# Patient Record
Sex: FEMALE | URBAN - METROPOLITAN AREA
[De-identification: names, ages, dates, MRNs, and addresses within clinical notes are randomized per-mention and may not be internally consistent; named-entity substitution may affect disease eponyms.]

---

## 2022-01-24 ENCOUNTER — APPOINTMENT (RX ONLY)
Dept: URBAN - METROPOLITAN AREA CLINIC 74 | Facility: CLINIC | Age: 47
Setting detail: DERMATOLOGY
End: 2022-01-24

## 2022-01-24 DIAGNOSIS — L20.89 OTHER ATOPIC DERMATITIS: ICD-10-CM

## 2022-01-24 DIAGNOSIS — Z71.89 OTHER SPECIFIED COUNSELING: ICD-10-CM

## 2022-01-24 PROCEDURE — ? TREATMENT REGIMEN

## 2022-01-24 PROCEDURE — ? COUNSELING

## 2022-01-24 PROCEDURE — 99203 OFFICE O/P NEW LOW 30 MIN: CPT

## 2022-01-24 PROCEDURE — ? PATIENT SPECIFIC COUNSELING

## 2022-01-24 PROCEDURE — ? PRESCRIPTION

## 2022-01-24 RX ORDER — FLUOCINONIDE 0.5 MG/G
CREAM TOPICAL
Qty: 60 | Refills: 1 | Status: ERX | COMMUNITY
Start: 2022-01-24

## 2022-01-24 RX ADMIN — FLUOCINONIDE: 0.5 CREAM TOPICAL at 00:00

## 2022-01-24 ASSESSMENT — LOCATION DETAILED DESCRIPTION DERM
LOCATION DETAILED: LEFT INFERIOR FOREHEAD
LOCATION DETAILED: RIGHT PROXIMAL PRETIBIAL REGION

## 2022-01-24 ASSESSMENT — LOCATION SIMPLE DESCRIPTION DERM
LOCATION SIMPLE: RIGHT PRETIBIAL REGION
LOCATION SIMPLE: LEFT FOREHEAD

## 2022-01-24 ASSESSMENT — LOCATION ZONE DERM
LOCATION ZONE: LEG
LOCATION ZONE: FACE

## 2022-01-24 NOTE — PROCEDURE: PATIENT SPECIFIC COUNSELING
- Pt reports whole body red itchy rash that started around [place] on R shin\\n- now spread to both legs, shoulder\\n- started pravastatin 6mo ago, but denies other new meds\\n- COVID booster 11/3/21\\n- allergies: penicillin, seasonal allergies\\n- denies new skincare products \\n- denies recent illnesses\\n- papular and nummular on exam today \\n- refrain from shaving until clear \\n- spot treat affected areas w Fluocinonide \\n- photos today
Detail Level: Zone

## 2022-01-24 NOTE — HPI: RASH
Is The Patient Presenting As Previously Scheduled?: Yes
How Severe Is Your Rash?: moderate
Is This A New Presentation, Or A Follow-Up?: Rash
Additional History: Reports rash started on R leg around [place]. Spread to L leg, now on L shoulder now. Has tried OTC products only. Reports history seasonal allergies, but denies other allergies. No new pets, contacts. No recent illness. Reports starting pravastatin 6mo ago, but denies other new medications.

## 2022-01-24 NOTE — PROCEDURE: TREATMENT REGIMEN
Initiate Treatment: - apply Fluocinonide 0.05% cream to affected areas of skin BID x 10d
Detail Level: Zone
Samples Given: CeraVe, Cetaphil, Vanicream
Plan: Rtc 3 wks
Otc Regimen: - dry and sensitive hypoallergenic skin care regimen such as CeraVe, Cetaphil, Vanicream

## 2022-02-16 ENCOUNTER — APPOINTMENT (RX ONLY)
Dept: URBAN - METROPOLITAN AREA CLINIC 73 | Facility: CLINIC | Age: 47
Setting detail: DERMATOLOGY
End: 2022-02-16

## 2022-02-16 DIAGNOSIS — L20.89 OTHER ATOPIC DERMATITIS: ICD-10-CM | Status: IMPROVED

## 2022-02-16 PROBLEM — L30.9 DERMATITIS, UNSPECIFIED: Status: ACTIVE | Noted: 2022-02-16

## 2022-02-16 PROCEDURE — ? TREATMENT REGIMEN

## 2022-02-16 PROCEDURE — ? PRESCRIPTION

## 2022-02-16 PROCEDURE — 99213 OFFICE O/P EST LOW 20 MIN: CPT

## 2022-02-16 PROCEDURE — ? COUNSELING

## 2022-02-16 RX ORDER — CLOBETASOL PROPIONATE 0.5 MG/G
OINTMENT TOPICAL
Qty: 30 | Refills: 0 | Status: ERX | COMMUNITY
Start: 2022-02-16

## 2022-02-16 RX ADMIN — CLOBETASOL PROPIONATE: 0.5 OINTMENT TOPICAL at 00:00

## 2022-02-16 ASSESSMENT — LOCATION ZONE DERM: LOCATION ZONE: LEG

## 2022-02-16 ASSESSMENT — LOCATION DETAILED DESCRIPTION DERM: LOCATION DETAILED: RIGHT PROXIMAL PRETIBIAL REGION

## 2022-02-16 ASSESSMENT — LOCATION SIMPLE DESCRIPTION DERM: LOCATION SIMPLE: RIGHT PRETIBIAL REGION

## 2022-02-16 NOTE — PROCEDURE: TREATMENT REGIMEN
Initiate Treatment: clobetasol 0.05 % topical ointment \\nQuantity: 30.0 g  Days Supply: 30\\nSig: Apply to affected areas bid x 10 days
Discontinue Regimen: Fluocinonide 0.05% cream to affected areas of skin BID x 10d
Detail Level: Zone
Plan: Rtc 2 wks
Otc Regimen: - dry and sensitive hypoallergenic skin care regimen such as CeraVe, Cetaphil, Vanicream

## 2022-02-16 NOTE — PROCEDURE: COUNSELING
Detail Level: Detailed
Patient Specific Counseling (Will Not Stick From Patient To Patient): -likely still popular eczema \\n-pt reports improvement with some spots remaining on legs, see pix\\n-reports areas no longer rough or itchy, erythema remaining\\n-states used fluocinonide cream bid x 2 weeks\\n-disc r/b/sed start clobetasol cream with occlusion\\n-if not resolved in 2 weeks with use of stronger steroid, consider bx\\nRTC 2 weeks